# Patient Record
Sex: MALE | Race: WHITE | NOT HISPANIC OR LATINO | Employment: STUDENT | URBAN - METROPOLITAN AREA
[De-identification: names, ages, dates, MRNs, and addresses within clinical notes are randomized per-mention and may not be internally consistent; named-entity substitution may affect disease eponyms.]

---

## 2018-08-04 ENCOUNTER — HOSPITAL ENCOUNTER (EMERGENCY)
Facility: HOSPITAL | Age: 28
Discharge: HOME | End: 2018-08-04
Admitting: EMERGENCY MEDICINE
Payer: COMMERCIAL

## 2018-08-04 VITALS
BODY MASS INDEX: 20.57 KG/M2 | RESPIRATION RATE: 18 BRPM | DIASTOLIC BLOOD PRESSURE: 82 MMHG | HEIGHT: 66 IN | HEART RATE: 66 BPM | SYSTOLIC BLOOD PRESSURE: 128 MMHG | OXYGEN SATURATION: 99 % | WEIGHT: 128 LBS
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
## 2018-08-04 PROCEDURE — 99283 EMERGENCY DEPT VISIT LOW MDM: CPT

## 2018-08-04 PROCEDURE — 90715 TDAP VACCINE 7 YRS/> IM: CPT | Performed by: PHYSICIAN ASSISTANT

## 2018-08-04 PROCEDURE — 90471 IMMUNIZATION ADMIN: CPT

## 2018-08-04 RX ORDER — AMOXICILLIN AND CLAVULANATE POTASSIUM 875; 125 MG/1; MG/1
1 TABLET, FILM COATED ORAL EVERY 12 HOURS SCHEDULED
Qty: 14 TABLET | Refills: 0 | Status: SHIPPED | OUTPATIENT
Start: 2018-08-04 | End: 2018-08-11

## 2018-08-04 RX ADMIN — TETANUS TOXOID, REDUCED DIPHTHERIA TOXOID AND ACELLULAR PERTUSSIS VACCINE, ADSORBED 0.5 ML: 5; 2.5; 8; 8; 2.5 SUSPENSION INTRAMUSCULAR at 21:25

## 2018-08-05 ENCOUNTER — HOSPITAL ENCOUNTER (EMERGENCY)
Dept: HOSPITAL 42 - ED | Age: 28
Discharge: HOME | End: 2018-08-05
Payer: MEDICAID

## 2018-08-05 VITALS — BODY MASS INDEX: 21.7 KG/M2

## 2018-08-05 VITALS
DIASTOLIC BLOOD PRESSURE: 68 MMHG | TEMPERATURE: 98.5 F | HEART RATE: 69 BPM | SYSTOLIC BLOOD PRESSURE: 118 MMHG | OXYGEN SATURATION: 99 %

## 2018-08-05 VITALS — RESPIRATION RATE: 18 BRPM

## 2018-08-05 DIAGNOSIS — S50.871A: Primary | ICD-10-CM
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
## 2018-08-05 NOTE — ED PROVIDER NOTES
History  Chief Complaint   Patient presents with    Animal Bite     Patient states he got bit by a dog while he was out walking out tonight  Patient does not know the owner of the dog  Erik Rowley is a 34 y o  male w PMH none significant who presents for evaluation of animal bite  Patient had animal bite of the R arm  Bit by dog he doesn't know at the park  Didn't get any owner info for contact purposes and doesn't know if dog is vaccinated  Does not have much pain  Unknown date last tetanus, applied abx ointment  None       History reviewed  No pertinent past medical history  History reviewed  No pertinent surgical history  History reviewed  No pertinent family history  I have reviewed and agree with the history as documented  Social History   Substance Use Topics    Smoking status: Never Smoker    Smokeless tobacco: Never Used    Alcohol use No        Review of Systems   Constitutional: Negative for activity change, chills, diaphoresis, fatigue and fever  HENT: Negative for congestion and rhinorrhea  Eyes: Negative for pain  Respiratory: Negative for cough, chest tightness, shortness of breath and wheezing  Cardiovascular: Negative for chest pain and palpitations  Gastrointestinal: Negative for abdominal distention, constipation, diarrhea, nausea and vomiting  Genitourinary: Negative for difficulty urinating and dysuria  Musculoskeletal: Negative for arthralgias and myalgias  Skin: Positive for wound  Neurological: Negative for dizziness, weakness, light-headedness and headaches  Psychiatric/Behavioral: The patient is not nervous/anxious  Physical Exam  Physical Exam   Constitutional: He is oriented to person, place, and time  He appears well-developed and well-nourished  No distress  HENT:   Head: Normocephalic and atraumatic  Eyes: Pupils are equal, round, and reactive to light  Neck: Neck supple  No tracheal deviation present  Cardiovascular: Normal rate, regular rhythm and intact distal pulses  Exam reveals no gallop and no friction rub  No murmur heard  Pulmonary/Chest: Effort normal and breath sounds normal  No respiratory distress  He has no wheezes  He has no rales  Abdominal: Soft  Bowel sounds are normal  He exhibits no distension and no mass  There is no tenderness  There is no guarding  Musculoskeletal: He exhibits no edema or deformity  Neurological: He is alert and oriented to person, place, and time  Skin: Skin is warm and dry  He is not diaphoretic  Small abrasion to the R dorsum forearm   Psychiatric: He has a normal mood and affect  His behavior is normal    Nursing note and vitals reviewed  Vital Signs  ED Triage Vitals [08/04/18 2057]   Temp Pulse Respirations Blood Pressure SpO2   -- 66 18 128/82 99 %      Temp src Heart Rate Source Patient Position - Orthostatic VS BP Location FiO2 (%)   -- Monitor Sitting Left arm --      Pain Score       7           Vitals:    08/04/18 2057   BP: 128/82   Pulse: 66   Patient Position - Orthostatic VS: Sitting       Visual Acuity      ED Medications  Medications   tetanus-diphtheria-acellular pertussis (BOOSTRIX) IM injection 0 5 mL (0 5 mL Intramuscular Given 8/4/18 2125)       Diagnostic Studies  Results Reviewed     None                 No orders to display              Procedures  Procedures       Phone Contacts  ED Phone Contact    ED Course                               MDM  Number of Diagnoses or Management Options  Dog bite, initial encounter:   Diagnosis management comments: DDX includes but not ltd to:   Patient w dog bite and does not know the vaccination record of the animal and has no and contact the owner  He lives in [place] and is going back there tomorrow so no way to follow up and see the dog again  Dog cannot be quarantined or observed  Advised rabies vaccinations for this reason  Will update tetanus  Cover w augmentin for dog bite  Based on ED course:  Patient ended up refusing rabies vaccine here although was educated on r/b  The course of rabies vaccination was explained to him  He accepted tetanus  He reports he is going to think abt it and follow up with a hospital at home in [place] tomorrow if he chooses to receive vaccination  Explained he could get first dose here and follow up for consecutive doses in [place] but he prefers to get everything managed there if he decides to do so  Return parameters discussed  Pt requires f/u as an outpt  Pt expresses understanding w above treatment plan  All questions answered prior to d/c  Portions of the record may have been created with voice recognition software   Occasional wrong word or "sound a like" substitutions may have occurred due to the inherent limitations of voice recognition software   Read the chart carefully and recognize, using context, where substitutions have occurred  CritCare Time    Disposition  Final diagnoses:   Dog bite, initial encounter     Time reflects when diagnosis was documented in both MDM as applicable and the Disposition within this note     Time User Action Codes Description Comment    8/4/2018  9:17 PM Antonette Seay Add Gallicus Hector  0XXA] Dog bite, initial encounter       ED Disposition     ED Disposition Condition Comment    Discharge  Dasha Music discharge to home/self care  Condition at discharge: Good        Follow-up Information     Follow up With Specialties Details Why Contact Info        ED in [place] if you decide to get rabies vaccine           Discharge Medication List as of 8/4/2018 10:00 PM      START taking these medications    Details   amoxicillin-clavulanate (AUGMENTIN) 875-125 mg per tablet Take 1 tablet by mouth every 12 (twelve) hours for 7 days, Starting Sat 8/4/2018, Until Sat 8/11/2018, Print           No discharge procedures on file      ED Provider  Electronically Signed by           Sherrie Oliveira PA-C  08/05/18 6518

## 2018-08-05 NOTE — DISCHARGE INSTRUCTIONS
Animal Bite   WHAT YOU NEED TO KNOW:   Animal bite injuries range from shallow cuts to deep, life-threatening wounds  An animal can cut or puncture the skin when it bites  Your skin may be torn from your body  Your skin may swell or bruise even if the bite does not break the skin  Animal bites occur more often on the hands, arms, legs, and face  Bites from dogs and cats are the most common injuries  DISCHARGE INSTRUCTIONS:   Return to the emergency department if:   · You have a fever  · Your wound is red, swollen, and draining pus  · You see red streaks on the skin around the wound  · You can no longer move the bitten area  · Your heartbeat and breathing are much faster than usual     · You feel dizzy and confused  Contact your healthcare provider if:   · Your pain does not get better, even after you take pain medicine  · You have nightmares or flashbacks about the animal bite  · You have questions or concerns about your condition or care  Medicines: You may need any of the following:  · Antibiotics  prevent or treat a bacterial infection  · Prescription pain medicine  may be given  Ask how to take this medicine safely  · A tetanus vaccine  may be needed to prevent tetanus  Tetanus is a life-threatening bacterial infection that affects the nerves and muscles  The bacteria can be spread through animal bites  · A rabies vaccine  may be needed to prevent rabies  Rabies is a life-threatening viral infection  The virus can be spread through animal bites  · Take your medicine as directed  Contact your healthcare provider if you think your medicine is not helping or if you have side effects  Tell him of her if you are allergic to any medicine  Keep a list of the medicines, vitamins, and herbs you take  Include the amounts, and when and why you take them  Bring the list or the pill bottles to follow-up visits  Carry your medicine list with you in case of an emergency    Follow up with your healthcare provider in 1 to 2 days: You may need to return to have your stitches removed  Write down your questions so you remember to ask them during your visits  Self-care:   · Apply antibiotic ointment as directed  This helps prevent infection in minor skin wounds  It is available without a doctor's order  · Keep the wound clean and covered  Wash the wound every day with soap and water or germ-killing cleanser  Ask your healthcare provider about the kinds of bandages to use  · Apply ice on your wound  Ice helps decrease swelling and pain  Ice may also help prevent tissue damage  Use an ice pack, or put crushed ice in a plastic bag  Cover it with a towel and place it on your wound for 15 to 20 minutes every hour or as directed  · Elevate the wound area  Raise your wound above the level of your heart as often as you can  This will help decrease swelling and pain  Prop your wound on pillows or blankets to keep it elevated comfortably  Prevent another animal bite:   · Learn to recognize the signs of a scared or angry pet  Avoid quick, sudden movements  · Do not step between animals that are fighting  · Do not leave a pet alone with a young child  · Do not disturb an animal while it eats, sleeps, or cares for its young  · Do not approach an animal you do not know, especially one that is tied up or caged  · Stay away from animals that seem sick or act strangely  · Do not feed or capture wild animals  © 2017 [employer] Information is for End User's use only and may not be sold, redistributed or otherwise used for commercial purposes  All illustrations and images included in CareNotes® are the copyrighted property of [employer] , [employer]  or [employer] Mena  The above information is an  only  It is not intended as medical advice for individual conditions or treatments   Talk to your doctor, nurse or pharmacist before following any medical regimen to see if it is safe and effective for you

## 2018-08-05 NOTE — ED PDOC
Arrival/HPI





- General


Chief Complaint: Bite


Time Seen by Provider: 08/05/18 19:18


Historian: Patient





- History of Present Illness


Narrative History of Present Illness (Text): 





08/05/18 19:18


This 29 yo male who denies pmh, presents to this ED requesting Rabies vaccines.

  Patient stated he was bitten by a stray dog in a local park in [place].  

Patient stated he was seen in ED, where he received a Tetanus shot, but he 

refused to have rabies shot due to insurance coverage.  Patient denies other 

complains.


Time/Duration: Other (see hpi)


Context: Other (park)





Past Medical History





- Provider Review


Nursing Documentation Reviewed: Yes





- Psychiatric


Hx Psychophysiologic Disorder: No


Hx Substance Use: No





Family/Social History





- Physician Review


Nursing Documentation Reviewed: Yes


Family/Social History: Other (noncontributory)


Smoking Status: Never Smoked


Hx Alcohol Use: No


Hx Substance Use: No





Allergies/Home Meds


Allergies/Adverse Reactions: 


Allergies





No Known Allergies Allergy (Verified 01/07/17 01:50)


 











Review of Systems





- Review of Systems


Constitutional: Normal.  absent: Fatigue, Weight Change, Fevers, Night Sweats


Eyes: Normal


ENT: Normal


Respiratory: Normal


Cardiovascular: Normal


Gastrointestinal: Normal


Genitourinary Male: Normal


Musculoskeletal: Normal


Skin: Other (abrasion)


Neurological: Normal


Endocrine: Normal


Hemo/Lymphatic: Normal


Psychiatric: Normal





Physical Exam


Vital Signs











  Temp Pulse Resp BP Pulse Ox


 


 08/05/18 19:06  98.5 F  69  18  118/68  99


 


 08/05/18 18:29  98.5 F  69  16  118/68  99











Temperature: Afebrile


Blood Pressure: Normal


Pulse: Regular


Respiratory Rate: Normal


Appearance: Positive for: Well-Appearing, Non-Toxic, Comfortable


Pain Distress: None


Mental Status: Positive for: Alert and Oriented X 3





- Systems Exam


Head: Present: Atraumatic, Normocephalic


Pupils: Present: PERRL


Extroacular Muscles: Present: EOMI


Conjunctiva: Present: Normal


Mouth: Present: Moist Mucous Membranes


Neck: Present: Normal Range of Motion


Back: Present: Normal Inspection


Upper Extremity: Present: Normal ROM, NORMAL PULSES, Neurovascularly Intact, 

Capillary Refill < 2s, Other ((+) right forearm small supericial abrasion.  No 

cellulitis).  No: Cyanosis, Edema


Lower Extremity: Present: Normal Inspection.  No: Edema


Neurological: Present: GCS=15, CN II-XII Intact, Speech Normal


Skin: Present: Warm, Dry, Normal Color.  No: Rashes


Psychiatric: Present: Alert, Oriented x 3, Normal Insight, Normal Concentration





Medical Decision Making


ED Course and Treatment: 





08/05/18 19:58


Re-evaluation.  Patient feels better.  Discussed results and plan with patient 

who expresses understanding.  All questions answered and there is agreement 

with the plan to discharge home with instructions.  Patient stable for 

discharge.  Return if symptoms persist or worsen.


Patient was recommended to return to ED in 3 days for next rabies vaccine.





Re-evaluation Time: 19:58


Reassessment Condition: Re-examined, Improved





- Medication Orders


Current Medication Orders: 











Discontinued Medications





Amoxicillin/Clavulanate Potassium (Augmentin 875 Mg-125 Mg Tab)  1 tab PO STAT 

STA


   PRN Reason: Protocol


   Stop: 08/05/18 19:27


   Last Admin: 08/05/18 19:35  Dose: 1 tab





Rabies Immune Globulin (Hyperrab 300 Unit/Ml)  1,160 unit IM .ONCE ONE


   Stop: 08/05/18 19:21


   Last Admin: 08/05/18 19:56  Dose: 1,160 unit





IM Administration Charges


 Document     08/05/18 19:56  TA  (Rec: 08/05/18 19:56  TA  [id number])


     Injection Site


      MAR Injection Site                         Left Deltoid


     Charges for Administration


      # of IM Administrations                    1





Rabies Vaccine Human Diploid Cell (Rabavert Vaccine Inj)  2.5 units IM .ONCE ONE


   Stop: 08/05/18 19:21


   Last Admin: 08/05/18 19:43  Dose: 2.5 units





MAR Immunization Data


 Document     08/05/18 19:43  TA  (Rec: 08/05/18 19:44  TA  [id number])


     Immunization Data


      Vaccine Information Sheet Given            No


      Vaccine Information Sheet Given Date       08/05/18


Immunization Registry


 Document     08/05/18 19:43  TA  (Rec: 08/05/18 19:44  TA  [id number])


      Immunization Registry Consent Date         08/05/18














Disposition/Present on Arrival





- Present on Arrival


Any Indicators Present on Arrival: No


History of DVT/PE: No


History of Uncontrolled Diabetes: No


Urinary Catheter: No


History of Decub. Ulcer: No


History Surgical Site Infection Following: None





- Disposition


Have Diagnosis and Disposition been Completed?: Yes


Diagnosis: 


 Dog bite





Disposition: HOME/ ROUTINE


Disposition Time: 19:59


Patient Plan: Discharge


Condition: GOOD


Discharge Instructions (ExitCare):  Rabies Vaccine


Additional Instructions: 


Call your doctor for revaluation of dog bite wound.  Return to emergency on 

August 8th, 12th, and 19th.  


Prescriptions: 


Amoxicillin/Clavulanate [Augmentin 875 MG-125 MG] 1 tab PO BID #14 tab


Referrals: 


Coco Rushing MD [Staff Provider] - Follow up with primary


 Service [Outside] - Follow up with primary


Forms:  [facility] (English)

## 2018-08-08 ENCOUNTER — HOSPITAL ENCOUNTER (EMERGENCY)
Dept: HOSPITAL 42 - ED | Age: 28
Discharge: HOME | End: 2018-08-08
Payer: MEDICAID

## 2018-08-08 VITALS — DIASTOLIC BLOOD PRESSURE: 78 MMHG | SYSTOLIC BLOOD PRESSURE: 124 MMHG | HEART RATE: 68 BPM | OXYGEN SATURATION: 99 %

## 2018-08-08 VITALS — BODY MASS INDEX: 21.7 KG/M2

## 2018-08-08 VITALS — RESPIRATION RATE: 18 BRPM | TEMPERATURE: 98.9 F

## 2018-08-08 DIAGNOSIS — Z23: Primary | ICD-10-CM

## 2018-08-08 NOTE — ED PDOC
Arrival/HPI





- General


Chief Complaint: Rabies Vaccine Series


Time Seen by Provider: 08/08/18 18:13


Historian: Patient





- History of Present Illness


Narrative History of Present Illness (Text): 





08/08/18 19:59


28-year-old male presents for day 3 rabies vaccination. Patient states he was 

bit in the right forearm, states that wound is heal well without infection. 

Reports no other complaints. Denies any fever, chills, pain, swelling.





Past Medical History





- Infectious Disease


Hx of Infectious Diseases: None





- Tetanus Immunization


Tetanus Immunization: Up to Date





- Psychiatric


Hx Psychophysiologic Disorder: No


Hx Substance Use: No





Family/Social History


Family/Social History: No Known Family HX


Smoking Status: Never Smoked


Hx Alcohol Use: No


Hx Substance Use: No





Allergies/Home Meds


Allergies/Adverse Reactions: 


Allergies





No Known Allergies Allergy (Verified 08/08/18 18:20)


 








Home Medications: 


 Home Meds











 Medication  Instructions  Recorded  Confirmed


 


No Known Home Med  08/08/18 08/08/18














Review of Systems





- Review of Systems


Constitutional: absent: Fatigue, Fevers


Musculoskeletal: absent: Arthralgias, Back Pain, Neck Pain


Skin: Other (h/o dog bite to the R forearm).  absent: Rash, Skin Lesions, 

Laceration





Physical Exam


Vital Signs











  Temp Pulse Resp BP Pulse Ox


 


 08/08/18 19:48   68  18  124/78  99


 


 08/08/18 18:18  98.9 F  64  18  117/76  100











Temperature: Afebrile


Blood Pressure: Normal


Pulse: Regular


Respiratory Rate: Normal


Appearance: Positive for: Well-Appearing, Non-Toxic, Comfortable


Pain Distress: None


Mental Status: Positive for: Alert and Oriented X 3





- Systems Exam


Upper Extremity: Present: Normal Inspection, Normal ROM, NORMAL PULSES, 

Neurovascularly Intact, Capillary Refill < 2s, Other (+healing abrasion to the 

medial R forearm without evidence of infection, no erythema, no edema, no 

tenderness to palpation, no d/c.).  No: Edema, Tenderness, Swelling, Erythema, 

Temperature Abnormalties, Deformity


Neurological: Present: GCS=15, CN II-XII Intact, Motor Func Grossly Intact, 

Normal Sensory Function


Skin: Present: Warm, Dry, Normal Color.  No: Rashes


Psychiatric: Present: Alert, Oriented x 3





Medical Decision Making


ED Course and Treatment: 





08/08/18 20:02


Plan : 


- Rabies vaccination








Patient is to return to the ER for day 7 and day 14 of rabies vaccination. 





- Medication Orders


Current Medication Orders: 











Discontinued Medications





Rabies Vaccine Human Diploid Cell (Imovax Rabies)  2.5 units IM .ONCE ONE


   Stop: 08/08/18 18:23


   Last Admin: 08/08/18 19:11  Dose: 2.5 units





Immunization Registry


 Document     08/08/18 19:11  GMD  (Rec: 08/08/18 19:11  D  [id number])


      Immunization Registry Consent Date         08/08/18














- PA / NP / Resident Statement


MD/DO has reviewed & agrees with the documentation as recorded.





Disposition/Present on Arrival





- Present on Arrival


Any Indicators Present on Arrival: No


History of DVT/PE: No


History of Uncontrolled Diabetes: No


Urinary Catheter: No


History of Decub. Ulcer: No


History Surgical Site Infection Following: None





- Disposition


Have Diagnosis and Disposition been Completed?: Yes


Diagnosis: 


 Need for rabies vaccination





Disposition: HOME/ ROUTINE


Disposition Time: 18:30


Patient Plan: Discharge


Condition: STABLE


Discharge Instructions (ExitCare):  Rabies Vaccine


Additional Instructions: 


Return to emergency room for the remaining rabies vaccination : 


day 7  August 12th


day 14 August 19th


Forms:  [facility] (English)

## 2018-08-12 ENCOUNTER — HOSPITAL ENCOUNTER (EMERGENCY)
Dept: HOSPITAL 42 - ED | Age: 28
Discharge: HOME | End: 2018-08-12
Payer: MEDICAID

## 2018-08-12 VITALS — DIASTOLIC BLOOD PRESSURE: 75 MMHG | SYSTOLIC BLOOD PRESSURE: 111 MMHG | OXYGEN SATURATION: 100 % | HEART RATE: 63 BPM

## 2018-08-12 VITALS — TEMPERATURE: 99.2 F | RESPIRATION RATE: 18 BRPM

## 2018-08-12 VITALS — BODY MASS INDEX: 21.7 KG/M2

## 2018-08-12 DIAGNOSIS — Z23: Primary | ICD-10-CM

## 2018-08-12 NOTE — ED PDOC
Arrival/HPI





<Marcus Caban - Last Filed: 08/12/18 20:12>





- General


Historian: Patient





<Denise Montiel - Last Filed: 08/12/18 20:26>





- General


Chief Complaint: Bite


Time Seen by Provider: 08/12/18 19:12





- History of Present Illness


Narrative History of Present Illness (Text): 





08/12/18 20:14


28-year-old male presents today with a 7 of his rabies series. Patient states 7 

days he was bit by a dog in the right forearm. Patient denies fevers or chills. 

He denies pain. Patient states he has 3 days left of his antibiotics. Patient 

denies any other complaints. (Denise Montiel)





Past Medical History





- Provider Review


Nursing Documentation Reviewed: Yes





- Travel History


Have you recently traveled outside [place] w/in the past 3 mons?: No





- Infectious Disease


Hx of Infectious Diseases: None





- Tetanus Immunization


Tetanus Immunization: Up to Date





- Psychiatric


Hx Psychophysiologic Disorder: No


Hx Substance Use: No





- Anesthesia


Hx Anesthesia: No





<Denise Montiel - Last Filed: 08/12/18 20:26>





Family/Social History





- Physician Review


Nursing Documentation Reviewed: Yes


Family/Social History: Unknown Family HX


Smoking Status: Never Smoked


Hx Alcohol Use: No


Hx Substance Use: No





<Denise Montiel - Last Filed: 08/12/18 20:26>





Allergies/Home Meds





<Marcus Caban - Last Filed: 08/12/18 20:12>





<Denise Montiel - Last Filed: 08/12/18 20:26>


Allergies/Adverse Reactions: 


Allergies





No Known Allergies Allergy (Verified 08/12/18 19:20)


 








Home Medications: 


 Home Meds











 Medication  Instructions  Recorded  Confirmed


 


No Known Home Med  08/08/18 08/08/18














Review of Systems





- Review of Systems


Constitutional: absent: Fatigue, Fevers


Respiratory: absent: SOB, Cough


Cardiovascular: absent: Chest Pain, Palpitations


Gastrointestinal: absent: Abdominal Pain, Nausea, Vomiting


Musculoskeletal: absent: Arthralgias


Skin: Other (dog bite)


Neurological: absent: Headache, Dizziness


Psychiatric: absent: Anxiety, Depression





<Denise Montiel - Last Filed: 08/12/18 20:26>





Physical Exam


Vital Signs Reviewed: Yes


Temperature: Afebrile


Blood Pressure: Normal


Pulse: Regular


Respiratory Rate: Normal


Appearance: Positive for: Well-Appearing, Non-Toxic, Comfortable


Pain Distress: None


Mental Status: Positive for: Alert and Oriented X 3





- Systems Exam


Head: Present: Atraumatic


Respiratory/Chest: Present: Clear to Auscultation


Cardiovascular: Present: Regular Rate and Rhythm


Upper Extremity: Present: Normal ROM, Capillary Refill < 2s, Other (right volar 

forearm; small abrasion without erythema, edema, or tenderness).  No: Tenderness

, Swelling, Erythema, Deformity


Neurological: Present: GCS=15


Skin: Present: Warm, Dry


Psychiatric: Present: Alert, Oriented x 3





<Denise Montiel - Last Filed: 08/12/18 20:26>


Vital Signs











  Temp Pulse Resp BP Pulse Ox


 


 08/12/18 19:52   63  18  111/75  100


 


 08/12/18 19:18  99.2 F  65  18   99














Medical Decision Making





<Marcus Caban - Last Filed: 08/12/18 20:12>





<Denise Montiel - Last Filed: 08/12/18 20:26>


ED Course and Treatment: 





08/12/18 20:18


Patient is nontoxic well-appearing in no distress. Vital signs are stable





pt presents for day 7 of rabies vaccine. 





denies any complaints. 





wound healing without any signs of infection. 








Patient was advised to keep the wound clean and dry, apply bacitracin twice 

daily, take antibiotics as prescribed and return immediately if symptoms worsen 

persist or if new symptoms develop





pt was advised to return on august 16th for his final rabies vaccine. 








Patient verbalizes understanding of discharge instructions and need for 

immediate followup.








all aspects of this case were discussed the attending of record. 











Impression: need for rabies vaccine. 


continue antibiotics as prescribed


follow up with the primary care physician within the next 2 days


return on August 19th for your final rabies vaccine. 


return immediately if signs of infection develop; high fevers, increasing pain, 

redness, swelling or purulent discharge develop. 


 (Denise Montiel)





- Medication Orders


Current Medication Orders: 











Discontinued Medications





Rabies Vaccine Human Diploid Cell (Rabavert Vaccine Inj)  2.5 units IM .ONCE ONE


   Stop: 08/12/18 19:31


   Last Admin: 08/12/18 19:54  Dose: 2.5 units





Immunization Registry


 Document     08/12/18 19:54  EQ  (Rec: 08/12/18 19:54  EQ  [id number])


      Immunization Registry Consent Date         08/12/18














- PA / NP / Resident Statement


MD/DO has reviewed & agrees with the documentation as recorded.





<Marcus Caban - Last Filed: 08/12/18 20:12>





Disposition/Present on Arrival





<Marcus Caban - Last Filed: 08/12/18 20:12>





- Present on Arrival


Any Indicators Present on Arrival: No


History of DVT/PE: No


History of Uncontrolled Diabetes: No


Urinary Catheter: No


History of Decub. Ulcer: No


History Surgical Site Infection Following: None





- Disposition


Have Diagnosis and Disposition been Completed?: Yes


Disposition Time: 19:31


Patient Plan: Discharge





<Denise Montiel - Last Filed: 08/12/18 20:26>





- Disposition


Diagnosis: 


 Need for rabies vaccination





Disposition: HOME/ ROUTINE


Patient Problems: 


 Current Active Problems











Problem Status Onset


 


Need for rabies vaccination Acute  











Condition: GOOD


Additional Instructions: 


continue antibiotics as prescribed


follow up with the primary care physician within the next 2 days


return on August 19th for your final rabies vaccine. 


return immediately if signs of infection develop; high fevers, increasing pain, 

redness, swelling or purulent discharge develop. 


Referrals: 


Mary Whalen MD [Primary Care Provider] - Follow up with primary


Forms:  [employer] (English)

## 2018-08-19 ENCOUNTER — HOSPITAL ENCOUNTER (EMERGENCY)
Dept: HOSPITAL 42 - ED | Age: 28
Discharge: HOME | End: 2018-08-19
Payer: MEDICAID

## 2018-08-19 VITALS — RESPIRATION RATE: 18 BRPM | TEMPERATURE: 99.2 F

## 2018-08-19 VITALS — BODY MASS INDEX: 21.7 KG/M2

## 2018-08-19 VITALS — DIASTOLIC BLOOD PRESSURE: 78 MMHG | OXYGEN SATURATION: 98 % | SYSTOLIC BLOOD PRESSURE: 128 MMHG | HEART RATE: 80 BPM

## 2018-08-19 DIAGNOSIS — Z00.00: Primary | ICD-10-CM

## 2018-08-19 DIAGNOSIS — Z23: ICD-10-CM

## 2018-08-19 NOTE — ED PDOC
Arrival/HPI





- General


Chief Complaint: Rabies Vaccine Series


Time Seen by Provider: 08/19/18 19:58


Historian: Patient





- History of Present Illness


Narrative History of Present Illness (Text): 





08/19/18 20:00


28 year old male, with no significant PMH, who presents to the emergency 

department for rabies show follow up s/p being bit by a dog. Patient denies any 

side effects to previous shot given. No fever, chest pain, shortness of breath, 

nausea, vomiting, diarrhea, or other complaints reported. 





Time/Duration: Other (follow up )


Symptom Onset: Sudden





Past Medical History





- Provider Review


Nursing Documentation Reviewed: Yes





- Infectious Disease


Hx of Infectious Diseases: None





- Tetanus Immunization


Tetanus Immunization: Up to Date





- Psychiatric


Hx Psychophysiologic Disorder: No


Hx Substance Use: No





- Anesthesia


Hx Anesthesia: No





Family/Social History





- Physician Review


Nursing Documentation Reviewed: Yes


Family/Social History: Unknown Family HX


Smoking Status: Never Smoked


Hx Alcohol Use: No


Hx Substance Use: No





Allergies/Home Meds


Allergies/Adverse Reactions: 


Allergies





No Known Allergies Allergy (Verified 08/19/18 19:57)


 








Home Medications: 


 Home Meds











 Medication  Instructions  Recorded  Confirmed


 


No Known Home Med  08/08/18 08/19/18














Review of Systems





- Physician Review


All systems were reviewed & negative as marked: Yes





- Review of Systems


Constitutional: absent: Fatigue, Fevers


Respiratory: absent: SOB, Cough, Sputum


Cardiovascular: absent: Chest Pain, Palpitations


Gastrointestinal: absent: Abdominal Pain, Diarrhea, Nausea, Vomiting


Musculoskeletal: absent: Arthralgias, Back Pain, Myalgias


Skin: absent: Rash, Pruritis


Neurological: absent: Headache, Dizziness


Psychiatric: absent: Anxiety, Depression





Physical Exam


Vital Signs Reviewed: Yes


Vital Signs











  Temp Pulse Resp BP Pulse Ox


 


 08/19/18 20:26   80  18  128/78  98


 


 08/19/18 19:53  99.2 F  56 L  18  102/65  99











Temperature: Afebrile


Blood Pressure: Normal


Pulse: Bradycardic


Respiratory Rate: Normal


Appearance: Positive for: Well-Appearing, Non-Toxic, Comfortable


Pain Distress: None


Mental Status: Positive for: Alert and Oriented X 3





- Systems Exam


Head: Present: Atraumatic, Normocephalic


Pupils: Present: PERRL


Extroacular Muscles: Present: EOMI


Conjunctiva: Present: Normal


Ears: Present: NORMAL TM, Normal Canal.  No: Erythema


Pharnyx: Present: Soft Palate/Uvular Edema.  No: ERYTHEMA, EXUDATE, TONSILS 

ENLARGED


Nose (External): Present: Atraumatic.  No: Abrasion, Contusion


Nose (Internal): Present: Normal Inspection, No Active Bleeding.  No: Rhinorrhea

, Septal Hematoma, Epistaxis


Neck: Present: Normal Range of Motion.  No: Meningeal Signs, MIDLINE TENDERNESS


Respiratory/Chest: Present: Clear to Auscultation, Good Air Exchange.  No: 

Respiratory Distress, Accessory Muscle Use


Cardiovascular: Present: Regular Rate and Rhythm, Normal S1, S2.  No: Murmurs


Abdomen: Present: Normal Bowel Sounds.  No: Tenderness, Distention, Peritoneal 

Signs, Rebound


Upper Extremity: Present: Normal Inspection, Normal ROM, NORMAL PULSES, 

Neurovascularly Intact.  No: Edema, Deformity


Lower Extremity: Present: Normal Inspection, NORMAL PULSES.  No: CALF TENDERNESS


Neurological: Present: GCS=15, CN II-XII Intact, Speech Normal, Motor Func 

Grossly Intact, Gait Normal, Memory Normal


Skin: Present: Warm, Dry, Normal Color.  No: Rashes


Psychiatric: Present: Alert, Oriented x 3, Normal Insight, Normal Concentration





Medical Decision Making


ED Course and Treatment: 





08/19/18 


Impression:


28 year old male presents for follow up on rabies shot s/p being bit by dog. 





Plan:


-Rabies vaccine and discharge. 








- Medication Orders


Current Medication Orders: 











Discontinued Medications





Rabies Vaccine Human Diploid Cell (Imovax Rabies)  2.5 units IM .ONCE ONE


   Stop: 08/19/18 20:01


   Last Admin: 08/19/18 20:20  Dose: 2.5 units





MAR Immunization Data


 Document     08/19/18 20:20  RD  (Rec: 08/19/18 20:28  RD  [facility] [id number]


     Immunization Data


      Vaccine Information Sheet Given            Yes


      Vaccine Information Sheet Given Date       08/19/18


Immunization Registry


 Document     08/19/18 20:20  RD  (Rec: 08/19/18 20:28  RD  [facility]


      Immunization Registry Consent Date         08/19/18














- PA / NP / Resident Statement


MD/DO has reviewed & agrees with the documentation as recorded.





- Scribe Statement


The provider has reviewed the documentation as recorded by the Scribe





Karissa Nj





Provider Scribe Attestation:


All medical record entries made by the Scribe were at my direction and 

personally dictated by me. I have reviewed the chart and agree that the record 

accurately reflects my personal performance of the history, physical exam, 

medical decision making, and the department course for this patient. I have 

also personally directed, reviewed, and agree with the discharge instructions 

and disposition. 





Disposition/Present on Arrival





- Present on Arrival


Any Indicators Present on Arrival: No


History of DVT/PE: No


History of Uncontrolled Diabetes: No


Urinary Catheter: No


History of Decub. Ulcer: No


History Surgical Site Infection Following: None





- Disposition


Have Diagnosis and Disposition been Completed?: Yes


Diagnosis: 


 General medical examination





Disposition: HOME/ ROUTINE


Disposition Time: 20:30


Patient Plan: Discharge


Condition: GOOD


Additional Instructions: 


Discharge home with education on follow up with your own pmd within 2 days, 

return to the ER for any new or worsening signs or symptoms. 


Referrals: 


[facility] [place] - Follow up with primary


Forms:  [employer] (English), WORK NOTE